# Patient Record
Sex: FEMALE | Race: WHITE
[De-identification: names, ages, dates, MRNs, and addresses within clinical notes are randomized per-mention and may not be internally consistent; named-entity substitution may affect disease eponyms.]

---

## 2018-03-06 ENCOUNTER — HOSPITAL ENCOUNTER (EMERGENCY)
Dept: HOSPITAL 74 - JER | Age: 28
LOS: 1 days | Discharge: HOME | End: 2018-03-07
Payer: COMMERCIAL

## 2018-03-06 VITALS — BODY MASS INDEX: 34.9 KG/M2

## 2018-03-06 VITALS — DIASTOLIC BLOOD PRESSURE: 64 MMHG | HEART RATE: 96 BPM | TEMPERATURE: 98 F | SYSTOLIC BLOOD PRESSURE: 127 MMHG

## 2018-03-06 DIAGNOSIS — J11.1: Primary | ICD-10-CM

## 2018-03-07 NOTE — PDOC
History of Present Illness





<JorgeChristen Ciara - Last Filed: 03/07/18 01:07>





- General


History Source: Patient


Exam Limitations: No Limitations





- History of Present Illness


Initial Comments: 





03/07/18 01:01


The patient is a 27 year old female with no significant past medical history 

who presents to the ED with complaints of flu like symptoms for the past three 

days. The patient complains of subjectile fever, chills, body aches, sore 

throat and nasal congestion which has since moved down to her throat and caused 

her chest congestion and cough. She reports that she began to felt better 

yesterday but today she developed a cough. The patient reports treating her 

symptoms with Tylenol and Motrin. She reports one sick contact with her niece. 

She denies any nausea, vomiting, or diarrhea. 











<Susana Rosa - Last Filed: 03/07/18 01:13>





- General


Chief Complaint: Cold Symptoms


Stated Complaint: COLD SYMPTOMS


Time Seen by Provider: 03/06/18 23:23





Past History





- Past Medical History


COPD: No


Other medical history: Pt denies





- Suicide/Smoking/Psychosocial Hx


Smoking History: Never smoked


Have you smoked in the past 12 months: No


Information on smoking cessation initiated: No


Hx Alcohol Use: No


Drug/Substance Use Hx: No


Substance Use Type: None





<JorgeChristen Ciara - Last Filed: 03/07/18 01:07>





<Susana Rosa - Last Filed: 03/07/18 01:13>





- Past Medical History


Allergies/Adverse Reactions: 


 Allergies











Allergy/AdvReac Type Severity Reaction Status Date / Time


 


No Known Allergies Allergy   Verified 03/06/18 23:05











Home Medications: 


Ambulatory Orders





Oseltamivir Phosphate [Tamiflu -] 75 mg PO BID #10 capsule 03/07/18 











**Review of Systems





- Review of Systems


Able to Perform ROS?: Yes


Comments:: 





03/07/18 01:01


CONSTITUTIONAL:


Present: fever, chills 


Absent: no fatigue


EYES:


Absent: visual changes


ENT:


Present: nasal congestion 


Absent: ear pain, no sore throat


CARDIOVASCULAR:


Absent: chest pain, no palpitations


RESPIRATORY:


Present: cough 


Absent:  no SOB


GI:


Absent: abdominal pain, no nausea, no vomiting, no constipation, no diarrhea


GENITOURINARY:


Absent: dysuria, no frequency, no hematuria


MUSKULOSKELETAL:


Absent: back pain, no arthralgia, no myalgia


SKIN:


Absent: rash


NEURO:


Absent: headache











All Other Systems: Reviewed and Negative





<Susana Rosa - Last Filed: 03/07/18 01:13>





*Physical Exam





- Vital Signs


 Last Vital Signs











Temp Pulse Resp BP Pulse Ox


 


 98.0 F   96 H  20   127/64   98 


 


 03/06/18 23:05  03/06/18 23:05  03/06/18 23:05  03/06/18 23:05  03/06/18 23:05














<Chrisetn Patel - Last Filed: 03/07/18 01:07>





- Vital Signs


 Last Vital Signs











Temp Pulse Resp BP Pulse Ox


 


 98.0 F   96 H  20   127/64   98 


 


 03/06/18 23:05  03/06/18 23:05  03/06/18 23:05  03/06/18 23:05  03/06/18 23:05














- Physical Exam


Comments: 





03/07/18 01:11


GENERAL: 


Well-appearing, well-nourished. No apparent distress.


HEENT: 


Erythematous throat but no exudate. Uvula is midline. Normocephalic, 

atraumatic. PERRL, EOM intact.


CARDIOVASCULAR: 


Normal S1, S2. Regular rate and rhythm.


PULMONARY: 


Clear to auscultation bilaterally.


ABDOMEN: 


Soft, non-distended, non-tender. 


EXTREMITIES: 


Normal ROM in all four extremities. No gross deformities.


SKIN: 


Warm, dry.  No rash


NEUROLOGICAL: 


No focal neurological deficits.








<Susana Rosa - Last Filed: 03/07/18 01:13>





*DC/Admit/Observation/Transfer





<Christen Patel - Last Filed: 03/07/18 01:07>





- Attestations


Scribe Attestion: 





03/07/18 01:02


Documentation prepared by Susana Rosa, acting as medical scribe for Christen Patel MD/DO.





<MoeSusana - Last Filed: 03/07/18 01:13>


Diagnosis at time of Disposition: 


 Flu syndrome








- Discharge Dispostion


Disposition: HOME


Condition at time of disposition: Stable





- Prescriptions


Prescriptions: 


Oseltamivir Phosphate [Tamiflu -] 75 mg PO BID #10 capsule





- Patient Instructions


Printed Discharge Instructions:  DI for Influenza -- Adult


Additional Instructions: 


tamiflu was sent to [facility] pharmacy